# Patient Record
Sex: MALE | Race: WHITE
[De-identification: names, ages, dates, MRNs, and addresses within clinical notes are randomized per-mention and may not be internally consistent; named-entity substitution may affect disease eponyms.]

---

## 2021-05-11 ENCOUNTER — HOSPITAL ENCOUNTER (OUTPATIENT)
Dept: HOSPITAL 75 - ER | Age: 36
Setting detail: OBSERVATION
LOS: 2 days | Discharge: HOME | End: 2021-05-13
Attending: SURGERY | Admitting: SURGERY
Payer: COMMERCIAL

## 2021-05-11 VITALS — SYSTOLIC BLOOD PRESSURE: 130 MMHG | DIASTOLIC BLOOD PRESSURE: 88 MMHG

## 2021-05-11 VITALS — BODY MASS INDEX: 24.91 KG/M2 | WEIGHT: 158.73 LBS | HEIGHT: 66.93 IN

## 2021-05-11 VITALS — DIASTOLIC BLOOD PRESSURE: 85 MMHG | SYSTOLIC BLOOD PRESSURE: 127 MMHG

## 2021-05-11 VITALS — DIASTOLIC BLOOD PRESSURE: 66 MMHG | SYSTOLIC BLOOD PRESSURE: 106 MMHG

## 2021-05-11 DIAGNOSIS — D64.89: ICD-10-CM

## 2021-05-11 DIAGNOSIS — S36.039A: Primary | ICD-10-CM

## 2021-05-11 DIAGNOSIS — Z79.891: ICD-10-CM
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
## 2021-05-11 LAB
BASOPHILS # BLD AUTO: 0 10^3/UL (ref 0–0.1)
BASOPHILS NFR BLD AUTO: 0 % (ref 0–10)
BUN/CREAT SERPL: 19
CALCIUM SERPL-MCNC: 8.7 MG/DL (ref 8.5–10.1)
CHLORIDE SERPL-SCNC: 102 MMOL/L (ref 98–107)
CO2 SERPL-SCNC: 24 MMOL/L (ref 21–32)
CREAT SERPL-MCNC: 0.89 MG/DL (ref 0.6–1.3)
EOSINOPHIL # BLD AUTO: 0 10^3/UL (ref 0–0.3)
EOSINOPHIL NFR BLD AUTO: 0 % (ref 0–10)
GFR SERPLBLD BASED ON 1.73 SQ M-ARVRAT: > 60 ML/MIN
GLUCOSE SERPL-MCNC: 115 MG/DL (ref 70–105)
HCT VFR BLD CALC: 42 % (ref 40–54)
HGB BLD-MCNC: 14.3 G/DL (ref 13.3–17.7)
LYMPHOCYTES # BLD AUTO: 1.5 10^3/UL (ref 1–4)
LYMPHOCYTES NFR BLD AUTO: 15 % (ref 12–44)
MANUAL DIFFERENTIAL PERFORMED BLD QL: NO
MCH RBC QN AUTO: 30 PG (ref 25–34)
MCHC RBC AUTO-ENTMCNC: 34 G/DL (ref 32–36)
MCV RBC AUTO: 87 FL (ref 80–99)
MONOCYTES # BLD AUTO: 0.8 10^3/UL (ref 0–1)
MONOCYTES NFR BLD AUTO: 8 % (ref 0–12)
NEUTROPHILS # BLD AUTO: 7.7 10^3/UL (ref 1.8–7.8)
NEUTROPHILS NFR BLD AUTO: 76 % (ref 42–75)
PLATELET # BLD: 307 10^3/UL (ref 130–400)
PMV BLD AUTO: 8.8 FL (ref 9–12.2)
POTASSIUM SERPL-SCNC: 4.4 MMOL/L (ref 3.6–5)
SODIUM SERPL-SCNC: 139 MMOL/L (ref 135–145)
WBC # BLD AUTO: 10.1 10^3/UL (ref 4.3–11)

## 2021-05-11 PROCEDURE — 36415 COLL VENOUS BLD VENIPUNCTURE: CPT

## 2021-05-11 PROCEDURE — 94664 DEMO&/EVAL PT USE INHALER: CPT

## 2021-05-11 PROCEDURE — 85025 COMPLETE CBC W/AUTO DIFF WBC: CPT

## 2021-05-11 PROCEDURE — 74177 CT ABD & PELVIS W/CONTRAST: CPT

## 2021-05-11 PROCEDURE — 80048 BASIC METABOLIC PNL TOTAL CA: CPT

## 2021-05-11 RX ADMIN — MORPHINE SULFATE PRN MG: 4 INJECTION, SOLUTION INTRAMUSCULAR; INTRAVENOUS at 19:20

## 2021-05-11 RX ADMIN — SODIUM CHLORIDE SCH MLS/HR: 900 INJECTION, SOLUTION INTRAVENOUS at 15:51

## 2021-05-11 NOTE — ED ABDOMINAL PAIN
General


Chief Complaint:  Abdominal/GI Problems


Stated Complaint:  LLQ PAIN





History of Present Illness


Date Seen by Provider:  May 11, 2021


Time Seen by Provider:  12:19


Initial Comments


Patient is a 35-year-old male who presents to the emergency department today 

with a chief complaint of abdominal pain after a fall from a roof yesterday.  

Patient states that he fell approximately 10 to 12 feet off of a roof in which 

she was working yesterday.  Patient states that certain movements, laying flat, 

palpation of the abdomen all make his pain worse.  He denies any nausea, 

vomiting or diarrhea.  No black or bloody stools.  No problems with dysfunction 

of bowel or bladder.  He denies back pain.  He took some ibuprofen this morning 

without much relief of symptoms.  He denies hitting his head, loss of 

consciousness.  He does not have chest pain or shortness of breath.





All other review of systems reviewed and negative except as stated above.


Timing/Duration:  12-24 Hours


Severity/Quality:  Moderate


Location:  LUQ, LLQ


Radiation:  No Radiation


Activities at Onset:  Activity


Associated Symptoms:  Denies Symptoms





Allergies and Home Medications


Allergies


Coded Allergies:  


     No Known Drug Allergies (Unverified , 13)





Home Medications


Hydrocodone Bit/Acetaminophen 1 Each Tablet, 1 EACH PO Q4HR PRN


   Prescribed by: FABRICIO HENLEY on 13 1057





Patient Home Medication List


Home Medication List Reviewed:  Yes





Review of Systems


Review of Systems


Constitutional:  see HPI


EENTM:  No Symptoms Reported


Respiratory:  No Symptoms Reported


Cardiovascular:  No Symptoms Reported


Gastrointestinal:  Abdominal Pain; Denies Nausea, Denies Rectal Bleeding, Denies

Vomiting


Genitourinary:  No Symptoms Reported


Musculoskeletal:  no symptoms reported


Skin:  no symptoms reported





All Other Systems Reviewed


Negative Unless Noted:  Yes





Past Medical-Social-Family Hx


Past Medical History


Reproductive Disorders:  No





Family Medical History


No Pertinent Family Hx





Physical Exam


Vital Signs





Vital Signs - First Documented








 21





 12:10


 


Temp 36.5


 


Pulse 78


 


Resp 16


 


B/P (MAP) 127/85 (99)


 


Pulse Ox 100


 


O2 Delivery Room Air





Capillary Refill :


Height/Weight/BMI


Height: 5'7.00"


Weight: 144lbs. oz. 65.293476jm;  BMI


Method:


General Appearance:  WD/WN, no apparent distress


Neck:  non-tender, full range of motion, supple


Respiratory:  lungs clear, normal breath sounds, no respiratory distress, no 

accessory muscle use


Cardiovascular:  regular rate, rhythm


Gastrointestinal:  normal bowel sounds, soft, no organomegaly, tenderness 

(Tenderness left lower quadrant left upper quadrant with voluntary guarding.  As

I was examining the patient while he was lying supine the patient had a spasm of

abdominal pain that brought him to his feet.)


Extremities:  normal range of motion, non-tender, normal inspection, no pedal 

edema, no calf tenderness


Back:  normal inspection


Neurologic/Psychiatric:  no motor/sensory deficits, alert, normal mood/affect, 

oriented x 3


Skin:  normal color, warm/dry, other (Patient has linear ecchymosis to the right

upper quadrant consistent with landing on an object)





Progress/Results/Core Measures


Results/Orders


Lab Results





Laboratory Tests








Test


 21


12:30 Range/Units


 


 


White Blood Count


 10.1 


 4.3-11.0


10^3/uL


 


Red Blood Count


 4.83 


 4.30-5.52


10^6/uL


 


Hemoglobin 14.3  13.3-17.7  g/dL


 


Hematocrit 42  40-54  %


 


Mean Corpuscular Volume 87  80-99  fL


 


Mean Corpuscular Hemoglobin 30  25-34  pg


 


Mean Corpuscular Hemoglobin


Concent 34 


 32-36  g/dL





 


Red Cell Distribution Width 12.8  10.0-14.5  %


 


Platelet Count


 307 


 130-400


10^3/uL


 


Mean Platelet Volume 8.8 L 9.0-12.2  fL


 


Immature Granulocyte % (Auto) 0   %


 


Neutrophils (%) (Auto) 76 H 42-75  %


 


Lymphocytes (%) (Auto) 15  12-44  %


 


Monocytes (%) (Auto) 8  0-12  %


 


Eosinophils (%) (Auto) 0  0-10  %


 


Basophils (%) (Auto) 0  0-10  %


 


Neutrophils # (Auto)


 7.7 


 1.8-7.8


10^3/uL


 


Lymphocytes # (Auto)


 1.5 


 1.0-4.0


10^3/uL


 


Monocytes # (Auto)


 0.8 


 0.0-1.0


10^3/uL


 


Eosinophils # (Auto)


 0.0 


 0.0-0.3


10^3/uL


 


Basophils # (Auto)


 0.0 


 0.0-0.1


10^3/uL


 


Immature Granulocyte # (Auto)


 0.0 


 0.0-0.1


10^3/uL


 


Sodium Level 139  135-145  MMOL/L


 


Potassium Level 4.4  3.6-5.0  MMOL/L


 


Chloride Level 102    MMOL/L


 


Carbon Dioxide Level 24  21-32  MMOL/L


 


Anion Gap 13  5-14  MMOL/L


 


Blood Urea Nitrogen 17  7-18  MG/DL


 


Creatinine


 0.89 


 0.60-1.30


MG/DL


 


Estimat Glomerular Filtration


Rate > 60 


  





 


BUN/Creatinine Ratio 19   


 


Glucose Level 115 H   MG/DL


 


Calcium Level 8.7  8.5-10.1  MG/DL








My Orders





Orders - OMAR SPRINGER MD


Ed Iv/Invasive Line Start (21 12:23)


Cbc With Automated Diff (21 12:23)


Basic Metabolic Panel (21 12:23)


Ct Abdomen/Pelvis W (21:23)


Ketorolac Injection (Toradol Injection) (21 12:30)


Ns Iv 1000 Ml (Sodium Chloride 0.9%) (21 12:30)


Iohexol Injection (Omnipaque 350 Mg/Ml 1 (21 12:45)


Received Contrast (Hold Metformin- Contr (21 12:45)


Ns (Ivpb) (Sodium Chloride 0.9% Ivpb Bag (21 12:45)





Medications Given in ED





Vital Signs/I&O











 21





 12:10


 


Temp 36.5


 


Pulse 78


 


Resp 16


 


B/P (MAP) 127/85 (99)


 


Pulse Ox 100


 


O2 Delivery Room Air














 21





 00:00


 


Intake Total 69294 ml


 


Balance 47091 ml











Progress


Progress Note :  


   Time:  14:36


Progress Note


Reevaluated patient, he is comfortable.  He has had a liter of IV fluids.  He 

will be placed on normal saline at 125 an hour and kept n.p.o.  Case was 

discussed with Dr. Major.  Recommends continued n.p.o. status, pain management 

with morphine IV.  Repeat CBC in the morning.  Patient has been advised of this 

as has his wife .  He is comfortable with the plan of care.  All questions have 

been sought and answered.





Diagnostic Imaging





   Diagonstic Imaging:  CT


   Plain Films/CT/US/NM/MRI:  abdomen


Comments


                 [facility].


                                [place], [place]





NAME:   EZEKIEL HILTON


MED REC#:   [id number]


ACCOUNT#:   [id number]


PT STATUS:   REG ER


:   1985


PHYSICIAN:   OMAR SPRINGER MD


ADMIT DATE:   21/ER


                                   ***Draft***


Date of Exam:21





CT ABDOMEN/PELVIS W








INDICATION: 


Fall from roof with abdominal pain.





TECHNIQUE: 


Multiple contiguous axial images were obtained through the


abdomen and pelvis after administration of intravenous contrast.


Auto Exposure Controls were utilized during the CT exam to meet


ALARA standards for radiation dose reduction. All CT scans use


one or more of the following dose optimizing techniques:


automated exposure control, MA and/or KvP adjustment based on


patient size and exam type or iterative reconstruction.





COMPARISON:   


2013.





FINDINGS:


The visualized portions of the lung bases are clear. There are no


pleural fluid collections. There is no free intraperitoneal air.





The liver shows no focal lesion. The gallbladder appears normal.


There appears to be a small laceration in the superior portion of


the spleen measuring about 12 mm in depth with a small area of


abnormal contrast blushing. There is a small amount of free fluid


around the spleen as well as around the liver, compatible with


hemoperitoneum. The adrenals and pancreas appear normal. The


kidneys appear normal. There is no retroperitoneal hematoma.


There is a small-to-moderate amount of free fluid in the pelvis.





There is no bony abnormality detected.





IMPRESSION:


The findings are compatible with a grade 2 laceration of the


spleen with abnormal blushing and moderate hemoperitoneum. These


results were called to Dr. Springer at the time of dictation.





  Dictated on workstation # [id number]








Dict:   21 1348


Trans:   21 1356


 [id number]





Interpreted by:     SAVANNAH RIOS MD


Electronically signed by:





Departure


Communication (Admissions)


Time/Spoke to Admitting Phy:  14:05


Case discussed with Dr. Major, recommends admission observation, IV fluids, 

n.p.o., pain medications and repeat CBC in the a.m.





Impression





   Primary Impression:  


   Spleen laceration


   Qualified Codes:  S36.039A - Unspecified laceration of spleen, initial 

   encounter


Disposition:   ADMITTED AS INPATIENT


Condition:  Stable





Admissions


Decision to Admit Reason:  Admit from ER (Trauma)


Decision to Admit/Date:  May 11, 2021


Time/Decision to Admit Time:  14:05





Departure-Patient Inst.


Referrals:  


[facility] (PCP/Family)


Primary Care Physician











OMAR SPRINGER MD         May 11, 2021 12:23

## 2021-05-11 NOTE — HISTORY & PHYSICAL-SURGICAL
History of Present Illness


History of Present Illness


Reason for visit/HPI


Surgery asked to admit Trauma pt for Splenic Laceration.





HPI per ED:  Patient is a 35-year-old male who presents to the emergency depart

ment today with a chief complaint of abdominal pain after a fall from a roof 

yesterday.  Patient states that he fell approximately 10 to 12 feet off of a 

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  Patient states that certain movements, 

laying flat, palpation of the abdomen all make his pain worse.  He denies any 

nausea, vomiting or diarrhea.  No black or bloody stools.  No problems with 

dysfunction of bowel or bladder.  He denies back pain.  He took some ibuprofen 

this morning without much relief of symptoms.  He denies hitting his head, loss 

of consciousness.  He does not have chest pain or shortness of breath.





All other review of systems reviewed and negative except as stated above.


Timing/Duration:  12-24 Hours


Severity/Quality:  Moderate


Location:  LUQ, LLQ


Radiation:  No Radiation


Activities at Onset:  Activity


Associated Symptoms:  Denies Symptoms





When I saw pt this evening he stated he had some abdominal pain, but not getting

worse; mostly in LUQ.  Stated he was hungry.


Date of Admission


May 11, 2021 at 14:35


Time Seen by a Provider:  16:43


I consulted on this patient on


5/11/21


 17:15


Attending Physician


Samir Coughlin DO


Admitting Physician


[facility]


Consult








Allergies and Home Medications


Allergies


Coded Allergies:  


     No Known Drug Allergies (Unverified , 8/6/13)





Home Medications


Hydrocodone Bit/Acetaminophen 1 Each Tablet, 1 EACH PO Q4HR PRN


   Prescribed by: FABRICIO HENLEY on 8/7/13 1057





Patient Home Medication List


Home Medication List Reviewed:  Yes





Past Medical-Social-Family Hx


Patient Social History


Smoking Status:  Never a Smoker


Alcohol Use?:  Yes


Have you traveled recently?:  No





Immunizations Up To Date


Date of Influenza Vaccine:  Oct 21, 2020





Surgeries


History of Surgeries:  No





Respiratory


History of Respiratory Disorde:  No





Cardiovascular


History of Cardiac Disorders:  No





Neurological


History of Neurological Disord:  No





Reproductive System


Hx Reproductive Disorders:  No





Genitourinary


History of Genitourinary Disor:  No





Gastrointestinal


History of Gastrointestinal Di:  No





Musculoskeletal


History of Musculoskeletal Dis:  No





Endocrine


History of Endocrine Disorders:  No





HEENT


History of HEENT Disorders:  No





Cancer


History of Cancer:  No





Psychosocial


History of Psychiatric Problem:  No





Integumentary


History of Skin or Integumenta:  No





Family Medical History


Significant Family History:  Diabetes (Pt denies his mother or father have DM), 

Hypertension (Denies any in his family)





Review of Systems


Constitutional:  No chills, No diaphoresis


EENTM:  No blurred vision, No double vision, No mouth pain, No mouth swelling, 

No epistaxis


Respiratory:  No cough, No dyspnea on exertion, No hemoptysis


Cardiovascular:  No chest pain, No edema, No palpitations


Gastrointestinal:  abdominal pain (LUQ); No dysphagia, No hematemesis, No 

jaundice, No melena


Genitourinary:  No dysuria, No frequency, No hematuria


Musculoskeletal:  No back pain, No joint pain, No joint swelling, No muscle 

stiffness


Skin:  No change in color, No change in hair/nails


Psychiatric/Neurological:  Denies Anxiety, Denies Depressed, Denies Seizure, 

Denies Tingling, Denies Tremors


Pt denies any hx of abnormal bleeding or bruising.





Physical Exam


Vital Signs





Vital Signs - First Documented








 5/11/21 5/11/21





 12:10 15:28


 


Temp 36.5 


 


Pulse 78 


 


Resp 16 


 


B/P (MAP) 127/85 (99) 


 


Pulse Ox 100 


 


O2 Delivery Room Air 


 


FiO2  21





Capillary Refill : Less Than 3 Seconds


Height, Weight, BMI


Height: 5'7.00"


Weight: 144lbs. oz. 65.471814tw; 24.91 BMI


Method:


General Appearance:  No Apparent Distress, WD/WN


Eyes:  Bilateral Eye PERRL, Bilateral Eye EOMI


HEENT:  Pharynx Normal, Moist Mucous Membranes


Neck:  Full Range of Motion, Non Tender, Supple


Respiratory:  Chest Non Tender, Lungs Clear, Normal Breath Sounds, No Accessory 

Muscle Use, No Respiratory Distress


Cardiovascular:  Regular Rate, Rhythm, No Murmur


Gastrointestinal:  No Organomegaly, No Pulsatile Mass, Soft; No Distended, No 

Guarding, No Rebound; Tenderness (LUQ and LLQ)


Rectal:  Deferred


Back:  No Vertebral Tenderness, CVA Tenderness (L)


Extremity:  Normal Capillary Refill, Normal Inspection, Normal Range of Motion, 

No Calf Tenderness


Neurologic/Psychiatric:  Alert, Oriented x3, No Motor/Sensory Deficits, Normal 

Mood/Affect, CNs II-XII Norm as Tested


Skin:  Normal Color, Warm/Dry


Lymphatic:  No Adenopathy (neck axilla or groin)





Data Review


Labs


Laboratory Tests


5/11/21 12:30: 


White Blood Count 10.1, Red Blood Count 4.83, Hemoglobin 14.3, Hematocrit 42, 

Mean Corpuscular Volume 87, Mean Corpuscular Hemoglobin 30, Mean Corpuscular 

Hemoglobin Concent 34, Red Cell Distribution Width 12.8, Platelet Count 307, 

Mean Platelet Volume 8.8L, Immature Granulocyte % (Auto) 0, Neutrophils (%) 

(Auto) 76H, Lymphocytes (%) (Auto) 15, Monocytes (%) (Auto) 8, Eosinophils (%) 

(Auto) 0, Basophils (%) (Auto) 0, Neutrophils # (Auto) 7.7, Lymphocytes # (Auto)

1.5, Monocytes # (Auto) 0.8, Eosinophils # (Auto) 0.0, Basophils # (Auto) 0.0, 

Immature Granulocyte # (Auto) 0.0, Sodium Level 139, Potassium Level 4.4, 

Chloride Level 102, Carbon Dioxide Level 24, Anion Gap 13, Blood Urea Nitrogen 

17, Creatinine 0.89, Estimat Glomerular Filtration Rate > 60, BUN/Creatinine Ra

kaleb 19, Glucose Level 115H, Calcium Level 8.7








Radiology


Date of Exam:05/11/21





CT ABDOMEN/PELVIS W








INDICATION: 


Fall from roof with abdominal pain.





TECHNIQUE: 


Multiple contiguous axial images were obtained through the


abdomen and pelvis after administration of intravenous contrast.


Auto Exposure Controls were utilized during the CT exam to meet


ALARA standards for radiation dose reduction. All CT scans use


one or more of the following dose optimizing techniques:


automated exposure control, MA and/or KvP adjustment based on


patient size and exam type or iterative reconstruction.





COMPARISON:   


08/16/2013.





FINDINGS:


The visualized portions of the lung bases are clear. There are no


pleural fluid collections. There is no free intraperitoneal air.





The liver shows no focal lesion. The gallbladder appears normal.


There appears to be a small laceration in the superior portion of


the spleen measuring about 12 mm in depth with a small area of


abnormal contrast blushing. There is a small amount of free fluid


around the spleen as well as around the liver, compatible with


hemoperitoneum. The adrenals and pancreas appear normal. The


kidneys appear normal. There is no retroperitoneal hematoma.


There is a small-to-moderate amount of free fluid in the pelvis.





There is no bony abnormality detected.





IMPRESSION:


The findings are compatible with a grade 2 laceration of the


spleen with abnormal blushing and moderate hemoperitoneum. These


results were called to Dr. Carrion at the time of dictation.





Dictated by: 





  Dictated on workstation # [id number]








Dict:   05/11/21 1348


Trans:   05/11/21 1642


 [id number]





Interpreted by:     SAVANNAH RIOS MD


Electronically signed by: SAVANNAH RIOS MD 05/11/21 1642





Assessment/Plan


Assessment/Plan


Admission Diagonsis


Splenic Laceration


Trauma Fall from 10ft


Admission Status:  Observation


Assessment/Plan


Splenic Laceration - Grade II


Trauma Fall from 10ft





Plan is to allow pt to eat tonight and then NPO after midnight.  Will recheck 

hemoglobin in am.  I went over options with pt:  1) watch and ck H/H (which is 

normal for Grade 1-3 splenic laceration)  2) try to find a hospital that will


do a coil to stop bleeding (might be too small a vessel for this) 3) 

Splenectomy.  I went over what happens after splenectomy, the vaccinations he 

has to take and the possibility of a daily ABX for the rest of his life.  I 

think his best


option right now is too repeat hemoglobin and maybe tomorrow could get a repeat 

CT if his hemoglobin has significant drop (then he would again have CT plus 

other 3 options).  I also talked about no contact sports and to be careful


with any further trauma to Left flank which could restart bleeding.  He 

understood and wants to wait and see what hemoglobin level is in the am.











SAMIR COUGHLIN DO               May 11, 2021 17:22

## 2021-05-11 NOTE — DIAGNOSTIC IMAGING REPORT
INDICATION: 

Fall from roof with abdominal pain.



TECHNIQUE: 

Multiple contiguous axial images were obtained through the

abdomen and pelvis after administration of intravenous contrast.

Auto Exposure Controls were utilized during the CT exam to meet

ALARA standards for radiation dose reduction. All CT scans use

one or more of the following dose optimizing techniques:

automated exposure control, MA and/or KvP adjustment based on

patient size and exam type or iterative reconstruction.



COMPARISON:   

08/16/2013.



FINDINGS:

The visualized portions of the lung bases are clear. There are no

pleural fluid collections. There is no free intraperitoneal air.



The liver shows no focal lesion. The gallbladder appears normal.

There appears to be a small laceration in the superior portion of

the spleen measuring about 12 mm in depth with a small area of

abnormal contrast blushing. There is a small amount of free fluid

around the spleen as well as around the liver, compatible with

hemoperitoneum. The adrenals and pancreas appear normal. The

kidneys appear normal. There is no retroperitoneal hematoma.

There is a small-to-moderate amount of free fluid in the pelvis.



There is no bony abnormality detected.



IMPRESSION:

The findings are compatible with a grade 2 laceration of the

spleen with abnormal blushing and moderate hemoperitoneum. These

results were called to Dr. Carrion at the time of dictation.



Dictated by: 



  Dictated on workstation # [id number]

## 2021-05-12 VITALS — DIASTOLIC BLOOD PRESSURE: 57 MMHG | SYSTOLIC BLOOD PRESSURE: 102 MMHG

## 2021-05-12 VITALS — SYSTOLIC BLOOD PRESSURE: 122 MMHG | DIASTOLIC BLOOD PRESSURE: 71 MMHG

## 2021-05-12 VITALS — SYSTOLIC BLOOD PRESSURE: 117 MMHG | DIASTOLIC BLOOD PRESSURE: 76 MMHG

## 2021-05-12 VITALS — SYSTOLIC BLOOD PRESSURE: 104 MMHG | DIASTOLIC BLOOD PRESSURE: 65 MMHG

## 2021-05-12 VITALS — DIASTOLIC BLOOD PRESSURE: 62 MMHG | SYSTOLIC BLOOD PRESSURE: 102 MMHG

## 2021-05-12 VITALS — DIASTOLIC BLOOD PRESSURE: 69 MMHG | SYSTOLIC BLOOD PRESSURE: 117 MMHG

## 2021-05-12 VITALS — DIASTOLIC BLOOD PRESSURE: 64 MMHG | SYSTOLIC BLOOD PRESSURE: 119 MMHG

## 2021-05-12 LAB
BASOPHILS # BLD AUTO: 0 10^3/UL (ref 0–0.1)
BASOPHILS NFR BLD AUTO: 0 % (ref 0–10)
EOSINOPHIL # BLD AUTO: 0.1 10^3/UL (ref 0–0.3)
EOSINOPHIL NFR BLD AUTO: 1 % (ref 0–10)
HCT VFR BLD CALC: 31 % (ref 40–54)
HGB BLD-MCNC: 10.7 G/DL (ref 13.3–17.7)
LYMPHOCYTES # BLD AUTO: 1.7 10^3/UL (ref 1–4)
LYMPHOCYTES NFR BLD AUTO: 29 % (ref 12–44)
MANUAL DIFFERENTIAL PERFORMED BLD QL: NO
MCH RBC QN AUTO: 30 PG (ref 25–34)
MCHC RBC AUTO-ENTMCNC: 34 G/DL (ref 32–36)
MCV RBC AUTO: 88 FL (ref 80–99)
MONOCYTES # BLD AUTO: 0.6 10^3/UL (ref 0–1)
MONOCYTES NFR BLD AUTO: 10 % (ref 0–12)
NEUTROPHILS # BLD AUTO: 3.5 10^3/UL (ref 1.8–7.8)
NEUTROPHILS NFR BLD AUTO: 59 % (ref 42–75)
PLATELET # BLD: 234 10^3/UL (ref 130–400)
PMV BLD AUTO: 8.9 FL (ref 9–12.2)
WBC # BLD AUTO: 5.9 10^3/UL (ref 4.3–11)

## 2021-05-12 RX ADMIN — SODIUM CHLORIDE SCH MLS/HR: 900 INJECTION, SOLUTION INTRAVENOUS at 08:44

## 2021-05-12 RX ADMIN — SODIUM CHLORIDE SCH MLS/HR: 900 INJECTION, SOLUTION INTRAVENOUS at 17:01

## 2021-05-12 RX ADMIN — SODIUM CHLORIDE SCH MLS/HR: 900 INJECTION, SOLUTION INTRAVENOUS at 00:45

## 2021-05-12 RX ADMIN — MORPHINE SULFATE PRN MG: 4 INJECTION, SOLUTION INTRAMUSCULAR; INTRAVENOUS at 17:02

## 2021-05-13 VITALS — SYSTOLIC BLOOD PRESSURE: 107 MMHG | DIASTOLIC BLOOD PRESSURE: 66 MMHG

## 2021-05-13 VITALS — SYSTOLIC BLOOD PRESSURE: 98 MMHG | DIASTOLIC BLOOD PRESSURE: 60 MMHG

## 2021-05-13 LAB
BASOPHILS # BLD AUTO: 0 10^3/UL (ref 0–0.1)
BASOPHILS NFR BLD AUTO: 0 % (ref 0–10)
EOSINOPHIL # BLD AUTO: 0.1 10^3/UL (ref 0–0.3)
EOSINOPHIL NFR BLD AUTO: 2 % (ref 0–10)
HCT VFR BLD CALC: 33 % (ref 40–54)
HGB BLD-MCNC: 11 G/DL (ref 13.3–17.7)
LYMPHOCYTES # BLD AUTO: 1.6 10^3/UL (ref 1–4)
LYMPHOCYTES NFR BLD AUTO: 34 % (ref 12–44)
MANUAL DIFFERENTIAL PERFORMED BLD QL: NO
MCH RBC QN AUTO: 30 PG (ref 25–34)
MCHC RBC AUTO-ENTMCNC: 34 G/DL (ref 32–36)
MCV RBC AUTO: 89 FL (ref 80–99)
MONOCYTES # BLD AUTO: 0.4 10^3/UL (ref 0–1)
MONOCYTES NFR BLD AUTO: 8 % (ref 0–12)
NEUTROPHILS # BLD AUTO: 2.7 10^3/UL (ref 1.8–7.8)
NEUTROPHILS NFR BLD AUTO: 56 % (ref 42–75)
PLATELET # BLD: 210 10^3/UL (ref 130–400)
PMV BLD AUTO: 8.7 FL (ref 9–12.2)
WBC # BLD AUTO: 4.8 10^3/UL (ref 4.3–11)

## 2021-05-13 RX ADMIN — SODIUM CHLORIDE SCH MLS/HR: 900 INJECTION, SOLUTION INTRAVENOUS at 00:57

## 2021-05-13 RX ADMIN — MORPHINE SULFATE PRN MG: 4 INJECTION, SOLUTION INTRAMUSCULAR; INTRAVENOUS at 04:37

## 2021-05-13 RX ADMIN — SODIUM CHLORIDE SCH MLS/HR: 900 INJECTION, SOLUTION INTRAVENOUS at 07:45

## 2021-05-13 NOTE — PROGRESS NOTE - SURGERY
Subjective


Time Seen by a Provider:  10:01


Subjective/Events-last exam


Pt seen and examined; states very minimal abd pain, + flatus, no BM and does not

think abd is any more distended.


Review of Systems


General:  No Chills, No Night Sweats


Pulmonary:  No Dyspnea, No Cough


Cardiovascular:  No: Chest Pain, Palpitations


Gastrointestinal:  Abdominal Pain; No: Nausea, Vomiting





Objective


Exam





Vital Signs








  Date Time  Temp Pulse Resp B/P (MAP) Pulse Ox O2 Delivery O2 Flow Rate FiO2


 


5/13/21 08:00      Room Air  


 


5/13/21 07:10 36.4 66 16 107/66 (80) 99 Room Air  


 


5/13/21 03:25 36.2 67 18 98/60 (73) 98 Room Air  


 


5/12/21 23:33 36.3 85 18 117/69 (85) 100 Room Air  


 


5/12/21 19:30      Room Air  


 


5/12/21 19:11 36.5 77 18 117/76 (90) 100 Room Air  


 


5/12/21 15:32 36.1 66 18 122/71 (88) 100 Room Air  














I & O 


 


 5/13/21





 07:00


 


Intake Total 3810 ml


 


Balance 3810 ml





Capillary Refill : Less Than 3 Seconds


General Appearance:  No Apparent Distress, WD/WN


HEENT:  Moist Mucous Membranes


Respiratory:  Chest Non Tender, Lungs Clear, Normal Breath Sounds, No Accessory 

Muscle Use, No Respiratory Distress


Cardiovascular:  Regular Rate, Rhythm, No Murmur


Gastrointestinal:  normal bowel sounds, soft, no organomegaly, tenderness 

(Tenderness left lower quadrant left upper quadrant with voluntary guarding.  As

I was examining the patient while he was lying supine the patient had a spasm of

abdominal pain that brought him to his feet.)


Extremity:  No Calf Tenderness


Neurologic/Psychiatric:  Alert, Oriented x3, Normal Mood/Affect





Results


Lab


Laboratory Tests


5/13/21 08:15: 


White Blood Count 4.8, Red Blood Count 3.68L, Hemoglobin 11.0L, Hematocrit 33L, 

Mean Corpuscular Volume 89, Mean Corpuscular Hemoglobin 30, Mean Corpuscular 

Hemoglobin Concent 34, Red Cell Distribution Width 12.7, Platelet Count 210, 

Mean Platelet Volume 8.7L, Immature Granulocyte % (Auto) 0, Neutrophils (%) 

(Auto) 56, Lymphocytes (%) (Auto) 34, Monocytes (%) (Auto) 8, Eosinophils (%) 

(Auto) 2, Basophils (%) (Auto) 0, Neutrophils # (Auto) 2.7, Lymphocytes # (Auto)

1.6, Monocytes # (Auto) 0.4, Eosinophils # (Auto) 0.1, Basophils # (Auto) 0.0, 

Immature Granulocyte # (Auto) 0.0





Assessment/Plan


Splenic Laceration - Grade II


Trauma Fall from 10ft


Anemia secondary to Splenic Lac - Hg stable 10.7 to 11 today





Hg level is now stable, went over the risks, signs and symptoms of rebleed.  

Will send pt home and see him in a week.











SAMIR COUGHLIN DO               May 13, 2021 11:14

## 2021-05-13 NOTE — DISCHARGE INST-SURGICAL
Discharge Inst-Surgical


Depart Medication/Instructions


New, Converted or Re-Newed RX:  Other (No rx needed)


Patient Instructions


Follow up Appt:


Make appointment for 1 week. [phone]





Instructions:


Try to avoid most strenuous activity, no contact sports. 


May shower or tub bath or soaking.


Use incentive spirometer at home as directed.


No Smoking








Symptoms to Report:


Appetite Changes, Extremity Discoloration, Numbness/Tingling, Swelling 

Increased, Bleeding Excessive, Eyesight Changes, Pain Increased, Urine Color 

Change, Constipation(Persistent), Fever over 101 degree F, Pain/Pressure in 

chest, Urinating Difficulty, Cough Up/Vomit Blood, Heart Beat Irreg/Pounding, 

Pain/Pressure in jaw, Cramps in feet or legs, Lightheadedness, Pain/Pressure in 

shoulder, Diarrhea(Persistent), Memory Changes Suddenly, Questions/Concerns, 

Weight gain consecutive days, Dizziness/Fainting, Nausea/Vomiting, Shortness of 

Breath, Weight gain over 2 pounds








If questions or concerns contact your physician 


Or seek help at emergency department.





Activity


Activity as Tolerated:  Yes


Driving Instructions:  You May Drive





Diet


Discharge Diet:  No Restrictions


Diet After 24 Hours:  Clear Liquid if Nauseous


If Any Problems/Questions/Issu:  Contact Your Physician, Go to Emergency Room





Skin/Wound Care


Infection Signs and Symptoms:  Increased Redness, Foul Odor of Wound, Increased 

Drainage, Skin Itchy or Has a Rash, Increased Swelling, Temperature Above 101  F











SAMIR COUGHLIN DO               May 13, 2021 11:08